# Patient Record
Sex: FEMALE | HISPANIC OR LATINO | Employment: FULL TIME | URBAN - METROPOLITAN AREA
[De-identification: names, ages, dates, MRNs, and addresses within clinical notes are randomized per-mention and may not be internally consistent; named-entity substitution may affect disease eponyms.]

---

## 2019-10-01 ENCOUNTER — APPOINTMENT (OUTPATIENT)
Dept: RADIOLOGY | Facility: CLINIC | Age: 24
End: 2019-10-01
Payer: COMMERCIAL

## 2019-10-01 ENCOUNTER — OFFICE VISIT (OUTPATIENT)
Dept: OCCUPATIONAL THERAPY | Facility: CLINIC | Age: 24
End: 2019-10-01
Payer: COMMERCIAL

## 2019-10-01 ENCOUNTER — OFFICE VISIT (OUTPATIENT)
Dept: OBGYN CLINIC | Facility: CLINIC | Age: 24
End: 2019-10-01
Payer: COMMERCIAL

## 2019-10-01 VITALS
HEIGHT: 60 IN | BODY MASS INDEX: 42.8 KG/M2 | HEART RATE: 80 BPM | DIASTOLIC BLOOD PRESSURE: 76 MMHG | SYSTOLIC BLOOD PRESSURE: 118 MMHG | WEIGHT: 218 LBS

## 2019-10-01 DIAGNOSIS — M67.431 GANGLION CYST OF DORSUM OF RIGHT WRIST: Primary | ICD-10-CM

## 2019-10-01 DIAGNOSIS — IMO0002 LUMP: ICD-10-CM

## 2019-10-01 DIAGNOSIS — M67.431 GANGLION CYST OF DORSUM OF RIGHT WRIST: ICD-10-CM

## 2019-10-01 PROCEDURE — 97760 ORTHOTIC MGMT&TRAING 1ST ENC: CPT | Performed by: OCCUPATIONAL THERAPIST

## 2019-10-01 PROCEDURE — 99203 OFFICE O/P NEW LOW 30 MIN: CPT | Performed by: ORTHOPAEDIC SURGERY

## 2019-10-01 PROCEDURE — 73130 X-RAY EXAM OF HAND: CPT

## 2019-10-01 NOTE — PROGRESS NOTES
Assessment:     1  Ganglion cyst of dorsum of right wrist    2  Lump        Plan:      Problem List Items Addressed This Visit        Other    Ganglion cyst of dorsum of right wrist - Primary     Patient has small palpable dorsal ganglion cyst of right wrist   No pain to palpation, full motion of wrist  She will have occupational therapy make custom volar wrist splint to wear at work  If cyst gets larger call and can aspirate and inject, as when cyst gets larger it communicates with joint  If all options fall could do cyst excision but possible for the cyst to return  All patient's questions were answered to her satisfaction  This note is created using dictation transcription  It may contain typographical errors, grammatical errors, improperly dictated words, background noise and other errors  Relevant Orders    Ambulatory referral to Occupational Therapy      Other Visit Diagnoses     Lump        Relevant Orders    XR hand 3+ vw right         Subjective:     Patient ID: Lupe Fisher is a 25 y o  female  Chief Complaint:  25year old female in for evaluation of right hand  RHD  She has lump dorsal aspect of wrist  She has had it about a year with no previous injury or trauma  She states it will vary in size  She gets pain with overuse of wrist, flexion activities  She works in kitchen, does a lot of repetitive work  She denies any numbness or tingling in wrist,hand  Information on patient's intake form was reviewed  Allergy:  No Known Allergies  Medications:  all current active meds have been reviewed  Past Medical History:  History reviewed  No pertinent past medical history    Past Surgical History:  Past Surgical History:   Procedure Laterality Date    TONSILLECTOMY  2016     Family History:  Family History   Adopted: Yes     Social History:  Social History     Substance and Sexual Activity   Alcohol Use Yes    Comment: social     Social History     Substance and Sexual Activity   Drug Use Not on file     Social History     Tobacco Use   Smoking Status Former Smoker   Smokeless Tobacco Never Used     Review of Systems   Constitutional: Negative for chills and fever  HENT: Negative for drooling and sneezing  Eyes: Negative for redness  Respiratory: Negative for cough and wheezing  Cardiovascular: Negative  Gastrointestinal: Negative for nausea and vomiting  Genitourinary: Negative  Musculoskeletal: Negative for arthralgias and joint swelling  Neurological: Negative  Psychiatric/Behavioral: The patient is not nervous/anxious  Objective:  BP Readings from Last 1 Encounters:   10/01/19 118/76      Wt Readings from Last 1 Encounters:   10/01/19 98 9 kg (218 lb)      BMI:   Estimated body mass index is 42 5 kg/m² as calculated from the following:    Height as of this encounter: 5' 0 05" (1 525 m)  Weight as of this encounter: 98 9 kg (218 lb)  BSA:   Estimated body surface area is 1 94 meters squared as calculated from the following:    Height as of this encounter: 5' 0 05" (1 525 m)  Weight as of this encounter: 98 9 kg (218 lb)  Physical Exam   Constitutional: She is oriented to person, place, and time  She appears well-developed and well-nourished  HENT:   Head: Normocephalic and atraumatic  Eyes: Conjunctivae and EOM are normal    Neck: Neck supple  Pulmonary/Chest: Effort normal    Neurological: She is alert and oriented to person, place, and time  She has normal reflexes  Skin: Skin is warm and dry  Psychiatric: She has a normal mood and affect  Her behavior is normal  Judgment and thought content normal    Nursing note and vitals reviewed      Right Hand Exam     Comments:  Right wrist  Small dorsal palpable ganglion cyst   No pain to palpation  Pain with flexion of wrist at site of ganglion, she has full motion of wrist  Able to make composite fist 5/5  strength  Sensation intact to light touch             I have personally reviewed pertinent films in PACS and my interpretation is no degenerative arthritis, no osseus abnormalities, or soft tissue calcification           Scribe Attestation    I,:   Chanda Piper am acting as a scribe while in the presence of the attending physician :        I,:   Ronda Johnson MD personally performed the services described in this documentation    as scribed in my presence :

## 2019-10-01 NOTE — PROGRESS NOTES
Orthosis    Diagnosis:   1  Ganglion cyst of dorsum of right wrist  Ambulatory referral to Occupational Therapy     Indication: Motion Blocking    Location: Right  wrist  Supplies: Skin coverage  and Dressing   Orthosis type: Volar Hand-Wrist  Wearing Schedule: As Needed  Describe Position: wrist ext 30    Precautions: Universal (skin contact/breakdown)    Patient or Caregiver expresses understanding of wearing Schedule and Precautions? Yes  Patient or Caregiver able to don/doff orthotic independently? Yes    Written orders provided to patient?  Yes  Orders Obtained: Written  Orders Obtained from: Dr Mccarty [facility]    Return for evaluation and treatment Yes

## 2019-10-01 NOTE — ASSESSMENT & PLAN NOTE
Patient has small palpable dorsal ganglion cyst of right wrist   No pain to palpation, full motion of wrist  She will have occupational therapy make custom volar wrist splint to wear at work  If cyst gets larger call and can aspirate and inject, as when cyst gets larger it communicates with joint  If all options fall could do cyst excision but possible for the cyst to return  All patient's questions were answered to her satisfaction  This note is created using dictation transcription  It may contain typographical errors, grammatical errors, improperly dictated words, background noise and other errors